# Patient Record
Sex: MALE | Race: WHITE | NOT HISPANIC OR LATINO | Employment: OTHER | URBAN - METROPOLITAN AREA
[De-identification: names, ages, dates, MRNs, and addresses within clinical notes are randomized per-mention and may not be internally consistent; named-entity substitution may affect disease eponyms.]

---

## 2017-06-19 ENCOUNTER — OFFICE VISIT (OUTPATIENT)
Dept: ORTHOPEDIC SURGERY | Facility: CLINIC | Age: 72
End: 2017-06-19

## 2017-06-19 VITALS — WEIGHT: 204 LBS | TEMPERATURE: 99.2 F | HEIGHT: 70 IN | BODY MASS INDEX: 29.2 KG/M2

## 2017-06-19 DIAGNOSIS — M17.11 PRIMARY OSTEOARTHRITIS OF RIGHT KNEE: ICD-10-CM

## 2017-06-19 DIAGNOSIS — M25.561 CHRONIC PAIN OF BOTH KNEES: Primary | ICD-10-CM

## 2017-06-19 DIAGNOSIS — G89.29 CHRONIC PAIN OF BOTH KNEES: Primary | ICD-10-CM

## 2017-06-19 DIAGNOSIS — M17.12 PRIMARY OSTEOARTHRITIS OF LEFT KNEE: ICD-10-CM

## 2017-06-19 DIAGNOSIS — M25.562 CHRONIC PAIN OF BOTH KNEES: Primary | ICD-10-CM

## 2017-06-19 PROCEDURE — 20610 DRAIN/INJ JOINT/BURSA W/O US: CPT | Performed by: ORTHOPAEDIC SURGERY

## 2017-06-19 PROCEDURE — 73564 X-RAY EXAM KNEE 4 OR MORE: CPT | Performed by: ORTHOPAEDIC SURGERY

## 2017-06-19 PROCEDURE — 99213 OFFICE O/P EST LOW 20 MIN: CPT | Performed by: ORTHOPAEDIC SURGERY

## 2017-06-19 RX ORDER — LIDOCAINE HYDROCHLORIDE 10 MG/ML
3 INJECTION, SOLUTION INFILTRATION; PERINEURAL
Status: COMPLETED | OUTPATIENT
Start: 2017-06-19 | End: 2017-06-19

## 2017-06-19 RX ORDER — BUPIVACAINE HYDROCHLORIDE 5 MG/ML
3 INJECTION, SOLUTION PERINEURAL
Status: COMPLETED | OUTPATIENT
Start: 2017-06-19 | End: 2017-06-19

## 2017-06-19 RX ORDER — METHYLPREDNISOLONE ACETATE 80 MG/ML
80 INJECTION, SUSPENSION INTRA-ARTICULAR; INTRALESIONAL; INTRAMUSCULAR; SOFT TISSUE
Status: COMPLETED | OUTPATIENT
Start: 2017-06-19 | End: 2017-06-19

## 2017-06-19 RX ADMIN — LIDOCAINE HYDROCHLORIDE 3 ML: 10 INJECTION, SOLUTION INFILTRATION; PERINEURAL at 11:55

## 2017-06-19 RX ADMIN — METHYLPREDNISOLONE ACETATE 80 MG: 80 INJECTION, SUSPENSION INTRA-ARTICULAR; INTRALESIONAL; INTRAMUSCULAR; SOFT TISSUE at 11:54

## 2017-06-19 RX ADMIN — LIDOCAINE HYDROCHLORIDE 3 ML: 10 INJECTION, SOLUTION INFILTRATION; PERINEURAL at 11:54

## 2017-06-19 RX ADMIN — METHYLPREDNISOLONE ACETATE 80 MG: 80 INJECTION, SUSPENSION INTRA-ARTICULAR; INTRALESIONAL; INTRAMUSCULAR; SOFT TISSUE at 11:55

## 2017-06-19 RX ADMIN — BUPIVACAINE HYDROCHLORIDE 3 ML: 5 INJECTION, SOLUTION PERINEURAL at 11:54

## 2017-06-19 RX ADMIN — BUPIVACAINE HYDROCHLORIDE 3 ML: 5 INJECTION, SOLUTION PERINEURAL at 11:55

## 2017-06-19 NOTE — PROGRESS NOTES
"Karri Francis  1945    Chief Complaint   Patient presents with   • Left Knee - Follow-up, Pain   • Right Knee - Follow-up, Pain       HPI:      Past Medical History:   Diagnosis Date   • History of EKG 11/10/2015       Past Surgical History:   Procedure Laterality Date   • COLONOSCOPY  02/14/2003   • GALLBLADDER SURGERY     • ORIF ELBOW FRACTURE Left 10/14/2013         Current Outpatient Prescriptions:   •  Apple Russell Vn-Grn Tea-Bit Or-Cr (APPLE CIDER VINEGAR PLUS) tablet, Take  by mouth., Disp: , Rfl:   •  aspirin 81 MG tablet, Take  by mouth daily., Disp: , Rfl:   •  B Complex-C-E-Zn (BEC/ZINC) tablet, Take  by mouth., Disp: , Rfl:   •  benazepril (LOTENSIN) 20 MG tablet, TAKE ONE TABLET BY MOUTH DAILY, Disp: 90 tablet, Rfl: 0  •  calcium carbonate-vitamin d (CALCIUM 600+D) 600-400 MG-UNIT per tablet, Take  by mouth., Disp: , Rfl:   •  folic acid (FOLVITE) 400 MCG tablet, Take  by mouth., Disp: , Rfl:   •  Magnesium 250 MG tablet, Take  by mouth daily., Disp: , Rfl:   •  Saw Palmetto, Serenoa repens, 450 MG capsule, Take  by mouth., Disp: , Rfl:   •  silver sulfadiazine (SILVADENE, SSD) 1 % cream, Apply  topically 2 (Two) Times a Day., Disp: 50 g, Rfl: 1  •  Vardenafil HCl (STAXYN) 10 MG tablet dispersible, Take  by mouth., Disp: , Rfl:     Allergies   Allergen Reactions   • No Known Drug Allergy      Review of Systems - musculoskeletal: positive for pain, swelling, and stiffness in both knees.  All other systems were reviewed today, and were found to be negative for any acute complaints or problems otherwise.      Vitals:    06/19/17 1101   Temp: 99.2 °F (37.3 °C)   TempSrc: Temporal Artery    Weight: 204 lb (92.5 kg)   Height: 70\" (177.8 cm)   Body mass index is 29.27 kg/(m^2).      Physical exam: The patient is awake, alert, and oriented ×3.  The patient is in no acute distress.  Breathing is regular and unlabored with a respiratory rate of 12/m.  Extraocular movements and pupillary responses are " symmetrically intact. Sclerae are anicteric.   Hearing is within normal limits.  Speech is within normal limits.  There is no jugular venous distention.    The patient has somewhat of a lumbering gait but no pronounced antalgia.  He has a tendency towards varus malalignment of both knees.  He has no atrophy or asymmetry of the musculature of the lower extremities.  Motor strength is 5 over 5 throughout both lower extremity is.  Sensory exam is intact symmetrically light touch in both lower extremity is.  Capillary filling is brisk in all toes bilaterally.  Skin and nails are acutely unremarkable.  His calves are soft and nontender with no venous cord.  He does have diffuse prominent veins in his lower legs bilaterally consistent with chronic venous insufficiency.  He has no cellulitis, no lymphangitis, and no popliteal lymphadenopathy.    Right knee: Range of motion of right knee is 8° to 113° of flexion.  He has medial instability on valgus stressing.  He has crepitus on patella grind.  He has tenderness along the medial joint line.  There is no abnormal warmth, no effusion, no discoloration.    Left knee: Range of motion of the left knee is 10° to 115° of flexion.  The patient has medial instability on valgus stressing of the left knee.  He has crepitus on patella grind as well as tenderness along the medial joint line.  There is no abnormal warmth, mild effusion, no bruising or other discoloration about the left knee.            Radiology:  X-rays: A 4 view arthritis series of both of the patient's knees was ordered and reviewed today to assess the patient's complaints of bilateral knee pain.  These images reveal moderate to advanced tricompartmental osteoarthritis of both knees.  On the flexion PA views, the patient does have bone-on-bone changes in the medial compartment of both knees.  The degenerative changes are slightly more pronounced in the right knee as compared to the left.  I did compare these images  to images of the left knee that we had dating back to November 2014.  There were incidental views of the right knee at that time as well.  Since those images, the patient's medial joint line narrowing has progressed ever so slightly on both sides, but there has been no catastrophic change.        Assessment:  1. Chronic pain of both knees    - XR Knee 4+ View Bilateral    2. Primary osteoarthritis of left knee    - Large Joint Arthrocentesis  - Large Joint Arthrocentesis    3. Primary osteoarthritis of right knee    - Large Joint Arthrocentesis  - Large Joint Arthrocentesis      Plan:  I discussed everything with the patient at length.  We reviewed the options and his treatments today.  He has done physical therapy, and continues to do so on his own on a regular, home exercise basis.  He does use soft knee braces as well.  Nonsteroidals tend to bother his stomach, so he does not take those anymore.  He has had both cortisone shots and viscose supplement injections in the past, and does not feel that the Visco supplement helped him at all.  He has requested a cortisone injection for each of his knees after we discussed the options today.  He does not feel like he is ready for knee replacement, and I would agree, as long as he is able to maintain a reasonable functional level with periodic injections and his home physical therapy program.    Large Joint Arthrocentesis  Date/Time: 6/19/2017 11:54 AM  Consent given by: patient  Timeout: Immediately prior to procedure a time out was called to verify the correct patient, procedure, equipment, support staff and site/side marked as required   Supporting Documentation  Indications: pain and joint swelling   Procedure Details  Location: knee - R knee  Preparation: Patient was prepped and draped in the usual sterile fashion  Needle size: 22 G  Approach: anterolateral  Medications administered: 3 mL bupivacaine; 3 mL lidocaine 1 %; 80 mg methylPREDNISolone acetate 80  MG/ML  Patient tolerance: patient tolerated the procedure well with no immediate complications    Large Joint Arthrocentesis  Date/Time: 6/19/2017 11:55 AM  Consent given by: patient  Timeout: Immediately prior to procedure a time out was called to verify the correct patient, procedure, equipment, support staff and site/side marked as required   Supporting Documentation  Indications: pain and joint swelling   Procedure Details  Location: knee - L knee  Preparation: Patient was prepped and draped in the usual sterile fashion  Needle size: 22 G  Approach: anterolateral  Medications administered: 3 mL bupivacaine; 3 mL lidocaine 1 %; 80 mg methylPREDNISolone acetate 80 MG/ML  Patient tolerance: patient tolerated the procedure well with no immediate complications            Surya Mclaughlin MD  6/19/2017

## 2017-10-23 RX ORDER — BENAZEPRIL HYDROCHLORIDE 20 MG/1
TABLET ORAL
Qty: 30 TABLET | Refills: 6 | Status: SHIPPED | OUTPATIENT
Start: 2017-10-23 | End: 2017-10-23 | Stop reason: SDUPTHER

## 2017-10-23 RX ORDER — BENAZEPRIL HYDROCHLORIDE 20 MG/1
20 TABLET ORAL DAILY
Qty: 30 TABLET | Refills: 6 | Status: SHIPPED | OUTPATIENT
Start: 2017-10-23 | End: 2017-11-27 | Stop reason: SDUPTHER

## 2017-11-06 ENCOUNTER — OFFICE VISIT (OUTPATIENT)
Dept: INTERNAL MEDICINE | Facility: CLINIC | Age: 72
End: 2017-11-06

## 2017-11-06 VITALS
OXYGEN SATURATION: 97 % | WEIGHT: 197 LBS | SYSTOLIC BLOOD PRESSURE: 134 MMHG | DIASTOLIC BLOOD PRESSURE: 78 MMHG | BODY MASS INDEX: 28.2 KG/M2 | HEART RATE: 69 BPM | HEIGHT: 70 IN | TEMPERATURE: 98.7 F

## 2017-11-06 DIAGNOSIS — M25.561 CHRONIC PAIN OF BOTH KNEES: ICD-10-CM

## 2017-11-06 DIAGNOSIS — Z00.00 MEDICARE ANNUAL WELLNESS VISIT, INITIAL: Primary | ICD-10-CM

## 2017-11-06 DIAGNOSIS — R35.1 NOCTURIA: ICD-10-CM

## 2017-11-06 DIAGNOSIS — M25.562 CHRONIC PAIN OF BOTH KNEES: ICD-10-CM

## 2017-11-06 DIAGNOSIS — G89.29 CHRONIC PAIN OF BOTH KNEES: ICD-10-CM

## 2017-11-06 DIAGNOSIS — E78.49 OTHER HYPERLIPIDEMIA: ICD-10-CM

## 2017-11-06 DIAGNOSIS — N52.9 ED (ERECTILE DYSFUNCTION) OF ORGANIC ORIGIN: ICD-10-CM

## 2017-11-06 DIAGNOSIS — Z13.6 ENCOUNTER FOR ABDOMINAL AORTIC ANEURYSM SCREENING: ICD-10-CM

## 2017-11-06 DIAGNOSIS — I10 ESSENTIAL HYPERTENSION: ICD-10-CM

## 2017-11-06 PROCEDURE — 99214 OFFICE O/P EST MOD 30 MIN: CPT | Performed by: INTERNAL MEDICINE

## 2017-11-06 PROCEDURE — G0438 PPPS, INITIAL VISIT: HCPCS | Performed by: INTERNAL MEDICINE

## 2017-11-06 NOTE — PROGRESS NOTES
QUICK REFERENCE INFORMATION:  The ABCs of the Annual Wellness Visit    Initial Medicare Wellness Visit    HEALTH RISK ASSESSMENT    1945    Recent Hospitalizations:  No hospitalization(s) within the last year..        Current Medical Providers:  Patient Care Team:  Shahzad Plaza MD as PCP - General  Surya Mclaughlin MD as PCP - Claims Attributed        Smoking Status:  History   Smoking Status   • Never Smoker   Smokeless Tobacco   • Not on file       Alcohol Consumption:  History   Alcohol Use   • Yes       Depression Screen:   PHQ-2/PHQ-9 Depression Screening 11/6/2017   Little interest or pleasure in doing things 0   Feeling down, depressed, or hopeless 0   Total Score 0       Health Habits and Functional and Cognitive Screening:  Functional & Cognitive Status 11/6/2017   Do you have difficulty preparing food and eating? No   Do you have difficulty bathing yourself, getting dressed or grooming yourself? No   Do you have difficulty using the toilet? No   Do you have difficulty moving around from place to place? No   Do you have trouble with steps or getting out of a bed or a chair? No   In the past year have you fallen or experienced a near fall? No   Current Diet Well Balanced Diet   Dental Exam Up to date   Eye Exam Up to date   Exercise (times per week) 0 times per week   Current Exercise Activities Include None   Do you need help using the phone?  No   Are you deaf or do you have serious difficulty hearing?  No   Do you need help with transportation? No   Do you need help shopping? No   Do you need help preparing meals?  No   Do you need help with housework?  No   Do you need help with laundry? No   Do you need help taking your medications? No   Do you need help managing money? No   Have you felt unusual stress, anger or loneliness in the last month? No   Who do you live with? Spouse   If you need help, do you have trouble finding someone available to you? No   Have you been bothered in the last four  weeks by sexual problems? No   Do you have difficulty concentrating, remembering or making decisions? No           Does the patient have evidence of cognitive impairment? No    Asiprin use counseling: Taking ASA appropriately as indicated      Recent Lab Results:    Visual Acuity:  No exam data present    Age-appropriate Screening Schedule:  Refer to the list below for future screening recommendations based on patient's age, sex and/or medical conditions. Orders for these recommended tests are listed in the plan section. The patient has been provided with a written plan.    Health Maintenance   Topic Date Due   • INFLUENZA VACCINE  08/01/2017   • PNEUMOCOCCAL VACCINES (65+ LOW/MEDIUM RISK) (2 of 2 - PPSV23) 11/17/2017   • LIPID PANEL  11/17/2017   • COLONOSCOPY  03/03/2023   • TDAP/TD VACCINES (3 - Td) 11/17/2026   • ZOSTER VACCINE  Addressed        Subjective   History of Present Illness    Karri Francis is a 72 y.o. male who presents for an Annual Wellness Visit.    The following portions of the patient's history were reviewed and updated as appropriate: allergies, current medications, past family history, past medical history, past social history, past surgical history and problem list.    Outpatient Medications Prior to Visit   Medication Sig Dispense Refill   • Apple Russell Vn-Grn Tea-Bit Or-Cr (APPLE CIDER VINEGAR PLUS) tablet Take  by mouth.     • aspirin 81 MG tablet Take  by mouth daily.     • B Complex-C-E-Zn (BEC/ZINC) tablet Take  by mouth.     • benazepril (LOTENSIN) 20 MG tablet Take 1 tablet by mouth Daily. 30 tablet 6   • calcium carbonate-vitamin d (CALCIUM 600+D) 600-400 MG-UNIT per tablet Take  by mouth.     • folic acid (FOLVITE) 400 MCG tablet Take  by mouth.     • Magnesium 250 MG tablet Take  by mouth daily.     • Saw Palmetto, Serenoa repens, 450 MG capsule Take  by mouth.     • silver sulfadiazine (SILVADENE, SSD) 1 % cream Apply  topically 2 (Two) Times a Day. 50 g 1   • Vardenafil HCl  "(STAXYN) 10 MG tablet dispersible Take  by mouth.       No facility-administered medications prior to visit.        Patient Active Problem List   Diagnosis   • Benign familial tremor   • Difficulty hearing   • HLD (hyperlipidemia)   • BP (high blood pressure)   • Gonalgia   • ED (erectile dysfunction) of organic origin   • Arthritis of knee, degenerative   • Tinnitus of both ears   • Neuropathic ulnar nerve   • Burn of foot, right, second degree   • Biceps tendinitis   • Cubital tunnel syndrome   • Dupuytren's contracture   • Fracture of olecranon process of ulna   • Shoulder pain       Advance Care Planning:  has an advance directive - a copy has been provided and is in file    Identification of Risk Factors:  Risk factors include: no new recs.    Review of Systems    Compared to one year ago, the patient feels his physical health is the same.  Compared to one year ago, the patient feels his mental health is the same.    Objective     Physical Exam    Vitals:    11/06/17 1531   BP: 134/78   BP Location: Left arm   Patient Position: Sitting   Cuff Size: Adult   Pulse: 69   Temp: 98.7 °F (37.1 °C)   TempSrc: Tympanic   SpO2: 97%   Weight: 197 lb (89.4 kg)   Height: 70\" (177.8 cm)       Body mass index is 28.27 kg/(m^2).  Discussed the patient's BMI with him. The BMI is in the acceptable range.    Assessment/Plan   Patient Self-Management and Personalized Health Advice  The patient has been provided with information about: no new recs and preventive services including:   · no nerw recs.    Visit Diagnoses:  No diagnosis found.    No orders of the defined types were placed in this encounter.      Outpatient Encounter Prescriptions as of 11/6/2017   Medication Sig Dispense Refill   • Apple Russell Vn-Grn Tea-Bit Or-Cr (APPLE CIDER VINEGAR PLUS) tablet Take  by mouth.     • aspirin 81 MG tablet Take  by mouth daily.     • B Complex-C-E-Zn (BEC/ZINC) tablet Take  by mouth.     • benazepril (LOTENSIN) 20 MG tablet Take 1 " tablet by mouth Daily. 30 tablet 6   • calcium carbonate-vitamin d (CALCIUM 600+D) 600-400 MG-UNIT per tablet Take  by mouth.     • folic acid (FOLVITE) 400 MCG tablet Take  by mouth.     • Magnesium 250 MG tablet Take  by mouth daily.     • Saw Palmetto, Serenoa repens, 450 MG capsule Take  by mouth.     • silver sulfadiazine (SILVADENE, SSD) 1 % cream Apply  topically 2 (Two) Times a Day. 50 g 1   • Vardenafil HCl (STAXYN) 10 MG tablet dispersible Take  by mouth.       No facility-administered encounter medications on file as of 11/6/2017.        Reviewed use of high risk medication in the elderly: yes  Reviewed for potential of harmful drug interactions in the elderly: yes    Follow Up:  No Follow-up on file.     An After Visit Summary and PPPS with all of these plans were given to the patient.

## 2017-11-07 LAB
ALBUMIN SERPL-MCNC: 4.2 G/DL (ref 3.5–5.2)
ALBUMIN/GLOB SERPL: 1.4 G/DL
ALP SERPL-CCNC: 76 U/L (ref 39–117)
ALT SERPL-CCNC: 24 U/L (ref 1–41)
APPEARANCE UR: CLEAR
AST SERPL-CCNC: 23 U/L (ref 1–40)
BACTERIA #/AREA URNS HPF: NORMAL /HPF
BASOPHILS # BLD AUTO: 0.02 10*3/MM3 (ref 0–0.2)
BASOPHILS NFR BLD AUTO: 0.3 % (ref 0–1.5)
BILIRUB SERPL-MCNC: 0.5 MG/DL (ref 0.1–1.2)
BILIRUB UR QL STRIP: NEGATIVE
BUN SERPL-MCNC: 20 MG/DL (ref 8–23)
BUN/CREAT SERPL: 18.3 (ref 7–25)
CALCIUM SERPL-MCNC: 9.5 MG/DL (ref 8.6–10.5)
CASTS URNS MICRO: NORMAL
CHLORIDE SERPL-SCNC: 99 MMOL/L (ref 98–107)
CHOLEST SERPL-MCNC: 196 MG/DL (ref 0–200)
CO2 SERPL-SCNC: 25.9 MMOL/L (ref 22–29)
COLOR UR: YELLOW
CREAT SERPL-MCNC: 1.09 MG/DL (ref 0.76–1.27)
EOSINOPHIL # BLD AUTO: 0.14 10*3/MM3 (ref 0–0.7)
EOSINOPHIL NFR BLD AUTO: 1.8 % (ref 0.3–6.2)
EPI CELLS #/AREA URNS HPF: NORMAL /HPF
ERYTHROCYTE [DISTWIDTH] IN BLOOD BY AUTOMATED COUNT: 14.2 % (ref 11.5–14.5)
GFR SERPLBLD CREATININE-BSD FMLA CKD-EPI: 66 ML/MIN/1.73
GFR SERPLBLD CREATININE-BSD FMLA CKD-EPI: 80 ML/MIN/1.73
GLOBULIN SER CALC-MCNC: 3.1 GM/DL
GLUCOSE SERPL-MCNC: 74 MG/DL (ref 65–99)
GLUCOSE UR QL: NEGATIVE
HCT VFR BLD AUTO: 43.3 % (ref 40.4–52.2)
HDLC SERPL-MCNC: 49 MG/DL (ref 40–60)
HGB BLD-MCNC: 14.8 G/DL (ref 13.7–17.6)
HGB UR QL STRIP: (no result)
IMM GRANULOCYTES # BLD: 0.02 10*3/MM3 (ref 0–0.03)
IMM GRANULOCYTES NFR BLD: 0.3 % (ref 0–0.5)
KETONES UR QL STRIP: NEGATIVE
LDLC SERPL CALC-MCNC: 115 MG/DL (ref 0–100)
LDLC/HDLC SERPL: 2.35 {RATIO}
LEUKOCYTE ESTERASE UR QL STRIP: NEGATIVE
LYMPHOCYTES # BLD AUTO: 2.68 10*3/MM3 (ref 0.9–4.8)
LYMPHOCYTES NFR BLD AUTO: 33.9 % (ref 19.6–45.3)
MCH RBC QN AUTO: 31.6 PG (ref 27–32.7)
MCHC RBC AUTO-ENTMCNC: 34.2 G/DL (ref 32.6–36.4)
MCV RBC AUTO: 92.5 FL (ref 79.8–96.2)
MONOCYTES # BLD AUTO: 0.66 10*3/MM3 (ref 0.2–1.2)
MONOCYTES NFR BLD AUTO: 8.3 % (ref 5–12)
NEUTROPHILS # BLD AUTO: 4.39 10*3/MM3 (ref 1.9–8.1)
NEUTROPHILS NFR BLD AUTO: 55.4 % (ref 42.7–76)
NITRITE UR QL STRIP: NEGATIVE
PH UR STRIP: 6 [PH] (ref 5–8)
PLATELET # BLD AUTO: 176 10*3/MM3 (ref 140–500)
POTASSIUM SERPL-SCNC: 4.2 MMOL/L (ref 3.5–5.2)
PROT SERPL-MCNC: 7.3 G/DL (ref 6–8.5)
PROT UR QL STRIP: NEGATIVE
PSA SERPL-MCNC: 0.82 NG/ML (ref 0–4)
RBC # BLD AUTO: 4.68 10*6/MM3 (ref 4.6–6)
RBC #/AREA URNS HPF: NORMAL /HPF
SODIUM SERPL-SCNC: 141 MMOL/L (ref 136–145)
SP GR UR: 1.01 (ref 1–1.03)
T4 FREE SERPL-MCNC: 0.98 NG/DL (ref 0.93–1.7)
TRIGL SERPL-MCNC: 159 MG/DL (ref 0–150)
TSH SERPL DL<=0.005 MIU/L-ACNC: 1.33 MIU/ML (ref 0.27–4.2)
UROBILINOGEN UR STRIP-MCNC: (no result) MG/DL
VLDLC SERPL CALC-MCNC: 31.8 MG/DL (ref 5–40)
WBC # BLD AUTO: 7.91 10*3/MM3 (ref 4.5–10.7)
WBC #/AREA URNS HPF: NORMAL /HPF

## 2017-11-13 DIAGNOSIS — Z13.6 ENCOUNTER FOR ABDOMINAL AORTIC ANEURYSM SCREENING: Primary | ICD-10-CM

## 2017-11-13 DIAGNOSIS — Z87.891 FORMER SMOKER: ICD-10-CM

## 2017-11-16 ENCOUNTER — HOSPITAL ENCOUNTER (OUTPATIENT)
Dept: ULTRASOUND IMAGING | Facility: HOSPITAL | Age: 72
Discharge: HOME | End: 2017-11-16

## 2017-11-16 ENCOUNTER — HOSPITAL ENCOUNTER (OUTPATIENT)
Dept: ULTRASOUND IMAGING | Facility: HOSPITAL | Age: 72
Discharge: HOME | End: 2017-11-16
Admitting: INTERNAL MEDICINE

## 2017-11-16 DIAGNOSIS — Z13.6 ENCOUNTER FOR ABDOMINAL AORTIC ANEURYSM SCREENING: ICD-10-CM

## 2017-11-16 DIAGNOSIS — Z87.891 FORMER SMOKER: ICD-10-CM

## 2017-11-16 PROCEDURE — 76706 US ABDL AORTA SCREEN AAA: CPT

## 2017-11-19 NOTE — PATIENT INSTRUCTIONS
Medicare Wellness  Personal Prevention Plan of Service     Date of Office Visit:  2017  Encounter Provider:  Shahzad Plaza MD  Place of Service:  [facility]  Patient Name: Karri Francis  :  1945    As part of the Medicare Wellness portion of your visit today, we are providing you with this personalized preventive plan of services (PPPS). This plan is based upon recommendations of the [place] Preventive Services Task Force (USPSTF) and the Advisory Committee on Immunization Practices (ACIP).    This lists the preventive care services that should be considered, and provides dates of when you are due. Items listed as completed are up-to-date and do not require any further intervention.    Health Maintenance   Topic Date Due   • PNEUMOCOCCAL VACCINES (65+ LOW/MEDIUM RISK) (2 of 2 - PPSV23) 2018 (Originally 2017)   • MEDICARE ANNUAL WELLNESS  2018   • LIPID PANEL  2018   • COLONOSCOPY  2023   • TDAP/TD VACCINES (3 - Td) 2026   • INFLUENZA VACCINE  Addressed   • ZOSTER VACCINE  Addressed       Orders Placed This Encounter   Procedures   • US Aorta Limited     Standing Status:   Future     Standing Expiration Date:   2018     Order Specific Question:   Reason for Exam:     Answer:   former smoker   • Lipid Panel With LDL / HDL Ratio     Order Specific Question:   [facility] Has the patient fasted?     Answer:   Yes   • Comprehensive Metabolic Panel     Order Specific Question:   [facility] Has the patient fasted?     Answer:   Yes   • T4, Free     Order Specific Question:   [facility] Has the patient fasted?     Answer:   Yes   • TSH     Order Specific Question:   [facility] Has the patient fasted?     Answer:   Yes   • PSA     Order Specific Question:   [facility] Has the patient fasted?     Answer:   Yes   • Microscopic Examination     Order Specific Question:   [facility] Has the patient fasted?     Answer:   Yes   • CBC &  Differential     Order Specific Question:   Manual Differential     Answer:   No     Order Specific Question:   [facility] Has the patient fasted?     Answer:   Yes   • Urinalysis With Microscopic - Urine, Clean Catch     Order Specific Question:   [facility] Has the patient fasted?     Answer:   Yes       Return in about 1 year (around 11/6/2018) for Annual physical.

## 2017-11-19 NOTE — PROGRESS NOTES
Subjective   Karri Francis is a 72 y.o. male.   He is here today for Medicare annual wellness visit initial along with hypertension hyperlipidemia chronic pain of both knees erectile dysfunction nocturia and encounter for AAA screening as a former smoker  History of Present Illness   He is here today for Medicare annual wellness visit initial along with hypertension up lipidemia chronic pain of both knees erectile dysfunction nocturia and encounter for AAA screening as a former smoker  The following portions of the patient's history were reviewed and updated as appropriate: allergies, current medications, past family history, past medical history, past social history, past surgical history and problem list.    Review of Systems   Musculoskeletal: Positive for arthralgias.   All other systems reviewed and are negative.      Objective   Physical Exam   Constitutional: He is oriented to person, place, and time. He appears well-developed and well-nourished. He is cooperative.   HENT:   Head: Normocephalic and atraumatic.   Right Ear: Hearing, tympanic membrane, external ear and ear canal normal.   Left Ear: Hearing, tympanic membrane, external ear and ear canal normal.   Nose: Nose normal.   Mouth/Throat: Uvula is midline, oropharynx is clear and moist and mucous membranes are normal.   Eyes: Conjunctivae, EOM and lids are normal. Pupils are equal, round, and reactive to light.   Neck: Phonation normal. Neck supple. Carotid bruit is not present.   Cardiovascular: Normal rate, regular rhythm and normal heart sounds.  Exam reveals no gallop and no friction rub.    No murmur heard.  Pulmonary/Chest: Effort normal and breath sounds normal. No respiratory distress.   Abdominal: Soft. Bowel sounds are normal. He exhibits no distension and no mass. There is no hepatosplenomegaly. There is no tenderness. There is no rebound and no guarding. No hernia.   Musculoskeletal: He exhibits no edema.        Right knee: Tenderness  found.        Left knee: Tenderness found.   Neurological: He is alert and oriented to person, place, and time. Coordination and gait normal.   Skin: Skin is warm and dry.   Psychiatric: He has a normal mood and affect. His speech is normal and behavior is normal. Judgment and thought content normal.   Nursing note and vitals reviewed.      Assessment/Plan   Diagnoses and all orders for this visit:    Medicare annual wellness visit, initial  -     Lipid Panel With LDL / HDL Ratio  -     CBC & Differential  -     Comprehensive Metabolic Panel  -     T4, Free  -     TSH  -     Urinalysis With Microscopic - Urine, Clean Catch  -     PSA    Essential hypertension  -     Lipid Panel With LDL / HDL Ratio  -     CBC & Differential  -     Comprehensive Metabolic Panel  -     T4, Free  -     TSH  -     Urinalysis With Microscopic - Urine, Clean Catch  -     PSA    Other hyperlipidemia  -     Lipid Panel With LDL / HDL Ratio  -     CBC & Differential  -     Comprehensive Metabolic Panel  -     T4, Free  -     TSH  -     Urinalysis With Microscopic - Urine, Clean Catch  -     PSA    Chronic pain of both knees  -     Lipid Panel With LDL / HDL Ratio  -     CBC & Differential  -     Comprehensive Metabolic Panel  -     T4, Free  -     TSH  -     Urinalysis With Microscopic - Urine, Clean Catch  -     PSA    ED (erectile dysfunction) of organic origin  -     Lipid Panel With LDL / HDL Ratio  -     CBC & Differential  -     Comprehensive Metabolic Panel  -     T4, Free  -     TSH  -     Urinalysis With Microscopic - Urine, Clean Catch  -     PSA    Nocturia  -     Lipid Panel With LDL / HDL Ratio  -     CBC & Differential  -     Comprehensive Metabolic Panel  -     T4, Free  -     TSH  -     Urinalysis With Microscopic - Urine, Clean Catch  -     PSA    Encounter for abdominal aortic aneurysm screening  -     US Aorta Limited; Future    Other orders  -     Microscopic Examination      Medicare annual wellness visit initial  following recommendations which are no new recommendations  Erectile dysfunction supportive meds for this  Nocturia supportive meds and check PSA  Encounter for abdominal aortic aneurysm screening ordered  Chronic pain of both knees supportive meds and orthopedic consult when he is ready  Hyper lipidemia keep LDL less than 70 with proper diet exercise medication and he refuses statin medications  Hypertension well-controlled on benazepril and we will go from there

## 2017-11-27 ENCOUNTER — TELEPHONE (OUTPATIENT)
Dept: INTERNAL MEDICINE | Facility: CLINIC | Age: 72
End: 2017-11-27

## 2017-11-27 RX ORDER — BENAZEPRIL HYDROCHLORIDE 20 MG/1
20 TABLET ORAL DAILY
Qty: 30 TABLET | Refills: 6 | Status: SHIPPED | OUTPATIENT
Start: 2017-11-27 | End: 2018-07-27 | Stop reason: SDUPTHER

## 2017-11-27 NOTE — TELEPHONE ENCOUNTER
----- Message from Shahzad Plaza MD sent at 11/21/2017  8:25 PM EST -----  Notify patient ultrasound did not show any abdominal aortic aneurysm  Congratulations

## 2018-07-26 ENCOUNTER — OFFICE VISIT (OUTPATIENT)
Dept: INTERNAL MEDICINE | Facility: CLINIC | Age: 73
End: 2018-07-26

## 2018-07-26 VITALS
HEART RATE: 72 BPM | OXYGEN SATURATION: 94 % | BODY MASS INDEX: 29.72 KG/M2 | HEIGHT: 70 IN | SYSTOLIC BLOOD PRESSURE: 109 MMHG | TEMPERATURE: 96.8 F | WEIGHT: 207.6 LBS | DIASTOLIC BLOOD PRESSURE: 66 MMHG

## 2018-07-26 DIAGNOSIS — I73.9 PERIPHERAL VASCULAR DISEASE (HCC): ICD-10-CM

## 2018-07-26 DIAGNOSIS — R20.9 BILATERAL COLD FEET: Primary | ICD-10-CM

## 2018-07-26 DIAGNOSIS — Z12.11 ENCOUNTER FOR SCREENING COLONOSCOPY: ICD-10-CM

## 2018-07-26 DIAGNOSIS — I10 ESSENTIAL HYPERTENSION: ICD-10-CM

## 2018-07-26 DIAGNOSIS — E78.2 MIXED HYPERLIPIDEMIA: ICD-10-CM

## 2018-07-26 PROCEDURE — 99214 OFFICE O/P EST MOD 30 MIN: CPT | Performed by: INTERNAL MEDICINE

## 2018-07-27 LAB
ALBUMIN SERPL-MCNC: 4.4 G/DL (ref 3.5–5.2)
ALBUMIN/GLOB SERPL: 1.6 G/DL
ALP SERPL-CCNC: 67 U/L (ref 39–117)
ALT SERPL-CCNC: 22 U/L (ref 1–41)
APPEARANCE UR: CLEAR
AST SERPL-CCNC: 20 U/L (ref 1–40)
BACTERIA #/AREA URNS HPF: NORMAL /HPF
BASOPHILS # BLD AUTO: 0.01 10*3/MM3 (ref 0–0.2)
BASOPHILS NFR BLD AUTO: 0.1 % (ref 0–1.5)
BILIRUB SERPL-MCNC: 0.5 MG/DL (ref 0.1–1.2)
BILIRUB UR QL STRIP: NEGATIVE
BUN SERPL-MCNC: 22 MG/DL (ref 8–23)
BUN/CREAT SERPL: 17.7 (ref 7–25)
CALCIUM SERPL-MCNC: 9.4 MG/DL (ref 8.6–10.5)
CASTS URNS MICRO: NORMAL
CHLORIDE SERPL-SCNC: 100 MMOL/L (ref 98–107)
CHOLEST SERPL-MCNC: 189 MG/DL (ref 0–200)
CO2 SERPL-SCNC: 26.7 MMOL/L (ref 22–29)
COLOR UR: YELLOW
CREAT SERPL-MCNC: 1.24 MG/DL (ref 0.76–1.27)
EOSINOPHIL # BLD AUTO: 0.1 10*3/MM3 (ref 0–0.7)
EOSINOPHIL NFR BLD AUTO: 1.3 % (ref 0.3–6.2)
EPI CELLS #/AREA URNS HPF: NORMAL /HPF
ERYTHROCYTE [DISTWIDTH] IN BLOOD BY AUTOMATED COUNT: 14.1 % (ref 11.5–14.5)
GLOBULIN SER CALC-MCNC: 2.8 GM/DL
GLUCOSE SERPL-MCNC: 79 MG/DL (ref 65–99)
GLUCOSE UR QL: NEGATIVE
HCT VFR BLD AUTO: 45.1 % (ref 40.4–52.2)
HDLC SERPL-MCNC: 48 MG/DL (ref 40–60)
HGB BLD-MCNC: 14.2 G/DL (ref 13.7–17.6)
HGB UR QL STRIP: (no result)
IMM GRANULOCYTES # BLD: 0 10*3/MM3 (ref 0–0.03)
IMM GRANULOCYTES NFR BLD: 0 % (ref 0–0.5)
KETONES UR QL STRIP: NEGATIVE
LDLC SERPL CALC-MCNC: 116 MG/DL (ref 0–100)
LDLC/HDLC SERPL: 2.41 {RATIO}
LEUKOCYTE ESTERASE UR QL STRIP: NEGATIVE
LYMPHOCYTES # BLD AUTO: 2.98 10*3/MM3 (ref 0.9–4.8)
LYMPHOCYTES NFR BLD AUTO: 37.4 % (ref 19.6–45.3)
MCH RBC QN AUTO: 29.2 PG (ref 27–32.7)
MCHC RBC AUTO-ENTMCNC: 31.5 G/DL (ref 32.6–36.4)
MCV RBC AUTO: 92.6 FL (ref 79.8–96.2)
MONOCYTES # BLD AUTO: 0.66 10*3/MM3 (ref 0.2–1.2)
MONOCYTES NFR BLD AUTO: 8.3 % (ref 5–12)
NEUTROPHILS # BLD AUTO: 4.21 10*3/MM3 (ref 1.9–8.1)
NEUTROPHILS NFR BLD AUTO: 52.9 % (ref 42.7–76)
NITRITE UR QL STRIP: NEGATIVE
PH UR STRIP: 5.5 [PH] (ref 5–8)
PLATELET # BLD AUTO: 175 10*3/MM3 (ref 140–500)
POTASSIUM SERPL-SCNC: 4.5 MMOL/L (ref 3.5–5.2)
PROT SERPL-MCNC: 7.2 G/DL (ref 6–8.5)
PROT UR QL STRIP: NEGATIVE
PSA SERPL-MCNC: 0.7 NG/ML (ref 0–4)
RBC # BLD AUTO: 4.87 10*6/MM3 (ref 4.6–6)
RBC #/AREA URNS HPF: NORMAL /HPF
SODIUM SERPL-SCNC: 139 MMOL/L (ref 136–145)
SP GR UR: 1.02 (ref 1–1.03)
T4 FREE SERPL-MCNC: 1.08 NG/DL (ref 0.93–1.7)
TRIGL SERPL-MCNC: 126 MG/DL (ref 0–150)
TSH SERPL DL<=0.005 MIU/L-ACNC: 1.11 MIU/ML (ref 0.27–4.2)
UROBILINOGEN UR STRIP-MCNC: (no result) MG/DL
VLDLC SERPL CALC-MCNC: 25.2 MG/DL (ref 5–40)
WBC # BLD AUTO: 7.96 10*3/MM3 (ref 4.5–10.7)
WBC #/AREA URNS HPF: NORMAL /HPF

## 2018-07-27 RX ORDER — BENAZEPRIL HYDROCHLORIDE 20 MG/1
20 TABLET ORAL DAILY
Qty: 30 TABLET | Refills: 6 | Status: SHIPPED | OUTPATIENT
Start: 2018-07-27

## 2018-08-01 ENCOUNTER — HOSPITAL ENCOUNTER (OUTPATIENT)
Dept: CARDIOLOGY | Facility: HOSPITAL | Age: 73
Discharge: HOME | End: 2018-08-01
Admitting: INTERNAL MEDICINE

## 2018-08-01 DIAGNOSIS — I73.9 PERIPHERAL VASCULAR DISEASE (HCC): ICD-10-CM

## 2018-08-01 LAB
BH CV LOWER ARTERIAL LEFT ABI RATIO: 1.1
BH CV LOWER ARTERIAL LEFT DORSALIS PEDIS SYS MAX: 133 MMHG
BH CV LOWER ARTERIAL LEFT GREAT TOE SYS MAX: 125 MMHG
BH CV LOWER ARTERIAL LEFT POST TIBIAL SYS MAX: 138 MMHG
BH CV LOWER ARTERIAL LEFT TBI RATIO: 1
BH CV LOWER ARTERIAL RIGHT ABI RATIO: 1.3
BH CV LOWER ARTERIAL RIGHT DORSALIS PEDIS SYS MAX: 140 MMHG
BH CV LOWER ARTERIAL RIGHT GREAT TOE SYS MAX: 125 MMHG
BH CV LOWER ARTERIAL RIGHT POST TIBIAL SYS MAX: 151 MMHG
BH CV LOWER ARTERIAL RIGHT TBI RATIO: 1
UPPER ARTERIAL LEFT ARM BRACHIAL SYS MAX: 116 MMHG
UPPER ARTERIAL RIGHT ARM BRACHIAL SYS MAX: 101 MMHG

## 2018-08-01 PROCEDURE — 93922 UPR/L XTREMITY ART 2 LEVELS: CPT

## 2018-08-08 NOTE — PROGRESS NOTES
Subjective   Karri Francis is a 73 y.o. male.   He is here today for bilateral code feet along with peripheral vascular disease along with hypertension mixed hyper lipidemia and encounter for screening colonoscopy  History of Present Illness   He is here today for bilateral code feet along with peripheral vascular disease which are not stable along with hypertension which is well-controlled on current medication mixed hyper lipidemia which is stable on current medication and encounter for screening colonoscopy for which we will schedule  The following portions of the patient's history were reviewed and updated as appropriate: allergies, current medications, past family history, past medical history, past social history, past surgical history and problem list.    Review of Systems   Cardiovascular:        PVD with bilaterally cold feet   All other systems reviewed and are negative.      Objective   Physical Exam   Constitutional: He is oriented to person, place, and time. He appears well-developed and well-nourished. He is cooperative.   HENT:   Head: Normocephalic and atraumatic.   Right Ear: Hearing, tympanic membrane, external ear and ear canal normal.   Left Ear: Hearing, tympanic membrane, external ear and ear canal normal.   Nose: Nose normal.   Mouth/Throat: Uvula is midline, oropharynx is clear and moist and mucous membranes are normal.   Eyes: Pupils are equal, round, and reactive to light. Conjunctivae, EOM and lids are normal.   Neck: Phonation normal. Neck supple. Carotid bruit is not present.   Cardiovascular: Normal rate, regular rhythm and normal heart sounds.  Exam reveals no gallop and no friction rub.    No murmur heard.  Pulmonary/Chest: Effort normal and breath sounds normal. No respiratory distress.   Abdominal: Soft. Bowel sounds are normal. He exhibits no distension and no mass. There is no hepatosplenomegaly. There is no tenderness. There is no rebound and no guarding. No hernia.    Musculoskeletal: He exhibits no edema.     Vascular Status -  His right foot exhibits abnormal foot vasculature . His right foot exhibits no edema. His left foot exhibits abnormal foot vasculature . His left foot exhibits no edema.  Skin Integrity  -  His right foot skin is intact.His left foot skin is intact..  Neurological: He is alert and oriented to person, place, and time. Coordination and gait normal.   Skin: Skin is warm and dry.   Psychiatric: He has a normal mood and affect. His speech is normal and behavior is normal. Judgment and thought content normal.   Nursing note and vitals reviewed.      Assessment/Plan   Diagnoses and all orders for this visit:    Bilateral cold feet    Essential hypertension  -     PSA DIAGNOSTIC  -     CBC & Differential  -     Comprehensive Metabolic Panel  -     Urinalysis With Microscopic - Urine, Clean Catch    Mixed hyperlipidemia  -     Lipid Panel With LDL / HDL Ratio  -     T4, Free  -     TSH    Encounter for screening colonoscopy  -     Ambulatory Referral For Screening Colonoscopy    Peripheral vascular disease (CMS/HCC)  -     Doppler Ankle Brachial Index Single Level CAR; Future    Other orders  -     Microscopic Examination      Bilateral cold feet noted and we will check ankle brachial index  PVD again not stable and we will check ankle brachial index  Encounter for screen colonoscopy we will schedule colonoscopy  Mixed hyper lipidemia stable on current medication and we will check lipid panel today  Hypertension well-controlled on current medication no changes needed

## 2018-08-09 ENCOUNTER — TELEPHONE (OUTPATIENT)
Dept: INTERNAL MEDICINE | Facility: CLINIC | Age: 73
End: 2018-08-09

## 2018-08-10 RX ORDER — SILDENAFIL 50 MG/1
50 TABLET, FILM COATED ORAL DAILY PRN
Qty: 4 TABLET | Refills: 0 | Status: SHIPPED | OUTPATIENT
Start: 2018-08-10 | End: 2018-09-21

## 2018-09-21 ENCOUNTER — OFFICE VISIT (OUTPATIENT)
Dept: ORTHOPEDIC SURGERY | Facility: CLINIC | Age: 73
End: 2018-09-21

## 2018-09-21 VITALS — TEMPERATURE: 98.2 F | HEIGHT: 70 IN | BODY MASS INDEX: 28.63 KG/M2 | WEIGHT: 200 LBS

## 2018-09-21 DIAGNOSIS — M17.10 PRIMARY LOCALIZED OSTEOARTHROSIS OF LOWER LEG, UNSPECIFIED LATERALITY: ICD-10-CM

## 2018-09-21 DIAGNOSIS — M25.562 CHRONIC PAIN OF BOTH KNEES: Primary | ICD-10-CM

## 2018-09-21 DIAGNOSIS — G89.29 CHRONIC PAIN OF BOTH KNEES: Primary | ICD-10-CM

## 2018-09-21 DIAGNOSIS — M25.561 CHRONIC PAIN OF BOTH KNEES: Primary | ICD-10-CM

## 2018-09-21 PROCEDURE — 20610 DRAIN/INJ JOINT/BURSA W/O US: CPT | Performed by: ORTHOPAEDIC SURGERY

## 2018-09-21 PROCEDURE — 73562 X-RAY EXAM OF KNEE 3: CPT | Performed by: ORTHOPAEDIC SURGERY

## 2018-09-21 PROCEDURE — 99213 OFFICE O/P EST LOW 20 MIN: CPT | Performed by: ORTHOPAEDIC SURGERY

## 2018-09-21 RX ADMIN — LIDOCAINE HYDROCHLORIDE 4 ML: 10 INJECTION, SOLUTION EPIDURAL; INFILTRATION; INTRACAUDAL; PERINEURAL at 09:54

## 2018-09-21 RX ADMIN — METHYLPREDNISOLONE ACETATE 80 MG: 80 INJECTION, SUSPENSION INTRA-ARTICULAR; INTRALESIONAL; INTRAMUSCULAR; SOFT TISSUE at 09:54

## 2018-09-21 NOTE — PROGRESS NOTES
New Bilateral Knee      Patient: Karri Francis        YOB: 1945    Medical Record Number: [id number]        Chief Complaints: bilateral knee pain  Chief Complaint   Patient presents with   • Left Knee - Establish Care   • Right Knee - Establish Care           History of Present Illness: This is a 73-year-old male presents complaining of bilateral knee pain left is worse and right is been ongoing 5-6 years but much worse lately.  Symptoms are severe intermittent aching no new history injury change in activity worse with walking better with anti-inflammatories he is retired past medical history is remarkable for cancer hearing loss, hypertension hyperlipidemia and neuromuscular disorder        Allergies:   Allergies   Allergen Reactions   • No Known Drug Allergy        Medications:   Home Medications:  Current Outpatient Prescriptions on File Prior to Visit   Medication Sig   • Apple Russell Vn-Grn Tea-Bit Or-Cr (APPLE CIDER VINEGAR PLUS) tablet Take  by mouth.   • aspirin 81 MG tablet Take  by mouth daily.   • benazepril (LOTENSIN) 20 MG tablet Take 1 tablet by mouth Daily.   • folic acid (FOLVITE) 400 MCG tablet Take  by mouth.   • Magnesium 250 MG tablet Take  by mouth daily.   • Saw Palmetto, Serenoa repens, 450 MG capsule Take  by mouth.     No current facility-administered medications on file prior to visit.      Current Medications:  Scheduled Meds:  Continuous Infusions:  No current facility-administered medications for this visit.   PRN Meds:.    Past Medical History:   Diagnosis Date   • Cancer (CMS/HCC)    • History of EKG 11/10/2015   • HL (hearing loss)    • Hyperlipidemia    • Hypertension    • Neuromuscular disorder (CMS/HCC)         Past Surgical History:   Procedure Laterality Date   • COLONOSCOPY  02/14/2003    Dr. Seo   • GALLBLADDER SURGERY     • ORIF ELBOW FRACTURE Left 10/14/2013        Social History     Occupational History   •       Retired     Social History Main Topics  "  • Smoking status: Never Smoker   • Smokeless tobacco: Never Used   • Alcohol use Yes   • Drug use: No   • Sexual activity: Not on file      Social History     Social History Narrative   • No narrative on file        Family History   Problem Relation Age of Onset   • Other Other         cardiovascular disorder   • Asthma Other              Review of Systems: 14 point review of systems are remarkable for the pertinent positives listed in the chart by the patient the remainder are negative    Review of Systems      Physical Exam: 73 y.o. male  General Appearance:    Alert, cooperative, in no acute distress                   Vitals:    09/21/18 0927   Temp: 98.2 °F (36.8 °C)   TempSrc: Temporal Artery    Weight: 90.7 kg (200 lb)   Height: 177.8 cm (70\")      Patient is alert and read ×3 no acute distress appears her above-listed at height weight and age.  Affect is normal respiratory rate is normal unlabored. Heart rate regular rate rhythm, sclera, dentition and hearing are normal for the purpose of this exam.        Ortho Exam Physical exam of the left knee reveals no effusion, no erythema.  It mild loss of extension and full flexion  Patient has mild varus alignment.  They have mild tenderness to palpation about the medial compartment, no tenderness laterally..  The patient has a negative bounce home, negative Angel and a stable ligamentous exam.  Quad tone is reasonable and symmetric.  There are no overlying skin changes no lymphedema no lymphadenopathy.  There is good hip range of motion which is full symmetric and asymptomatic and a normal ankle exam.    Physical exam of the right knee reveals no effusion, no erythema.  It mild loss of extension and full flexion  Patient has mild varus alignment.  They have mild tenderness to palpation about the medial compartment, no tenderness laterally..  The patient has a negative bounce home, negative Angel and a stable ligamentous exam.  Quad tone is reasonable and " symmetric.  There are no overlying skin changes no lymphedema no lymphadenopathy.  There is good hip range of motion which is full symmetric and asymptomatic and a normal ankle exam.      Large Joint Arthrocentesis  Date/Time: 9/21/2018 9:54 AM  Consent given by: patient  Site marked: site marked  Timeout: Immediately prior to procedure a time out was called to verify the correct patient, procedure, equipment, support staff and site/side marked as required   Supporting Documentation  Indications: pain   Procedure Details  Location: knee - R knee  Preparation: Patient was prepped and draped in the usual sterile fashion  Needle size: 22 G  Approach: anteromedial  Medications administered: 80 mg methylPREDNISolone acetate 80 MG/ML; 4 mL lidocaine PF 1% 1 %  Patient tolerance: patient tolerated the procedure well with no immediate complications    Large Joint Arthrocentesis  Date/Time: 9/21/2018 9:54 AM  Consent given by: patient  Site marked: site marked  Timeout: Immediately prior to procedure a time out was called to verify the correct patient, procedure, equipment, support staff and site/side marked as required   Supporting Documentation  Indications: pain   Procedure Details  Location: knee - L knee  Preparation: Patient was prepped and draped in the usual sterile fashion  Needle size: 22 G  Approach: anteromedial  Medications administered: 80 mg methylPREDNISolone acetate 80 MG/ML; 4 mL lidocaine PF 1% 1 %  Patient tolerance: patient tolerated the procedure well with no immediate complications                   Radiology:   AP, Lateral and merchant views of the right and left knee  were ordered/reviewed to evauateknee pain.  I've no comparative films these show severe medial compartment OA with marked amount of joint space and moderate patellofemoral OA  Imaging Results (most recent)     Procedure Component Value Units Date/Time    XR Knee 3 View Bilateral [[id number]] Resulted:  09/21/18 0916     Updated:   09/21/18 0916    Impression:       Ordering physician's impression is located in the Encounter Note dated 09/21/18. X-ray performed in the DR room.          Assessment/Plan:    Bilateral knee pain which is degenerative in origin plan is to proceed with injections of both we discussed all his options including injections and ultimately knee replacements he does not want to think about knee replacements at this time

## 2018-09-24 RX ORDER — METHYLPREDNISOLONE ACETATE 80 MG/ML
80 INJECTION, SUSPENSION INTRA-ARTICULAR; INTRALESIONAL; INTRAMUSCULAR; SOFT TISSUE
Status: COMPLETED | OUTPATIENT
Start: 2018-09-21 | End: 2018-09-21

## 2018-09-24 RX ORDER — LIDOCAINE HYDROCHLORIDE 10 MG/ML
4 INJECTION, SOLUTION EPIDURAL; INFILTRATION; INTRACAUDAL; PERINEURAL
Status: COMPLETED | OUTPATIENT
Start: 2018-09-21 | End: 2018-09-21